# Patient Record
Sex: MALE | Race: WHITE | NOT HISPANIC OR LATINO | Employment: OTHER | URBAN - METROPOLITAN AREA
[De-identification: names, ages, dates, MRNs, and addresses within clinical notes are randomized per-mention and may not be internally consistent; named-entity substitution may affect disease eponyms.]

---

## 2017-10-23 ENCOUNTER — APPOINTMENT (OUTPATIENT)
Dept: PHYSICAL THERAPY | Facility: REHABILITATION | Age: 72
End: 2017-10-23
Payer: COMMERCIAL

## 2017-10-23 PROCEDURE — 97110 THERAPEUTIC EXERCISES: CPT

## 2017-10-23 PROCEDURE — G8991 OTHER PT/OT GOAL STATUS: HCPCS

## 2017-10-23 PROCEDURE — 97161 PT EVAL LOW COMPLEX 20 MIN: CPT

## 2017-10-23 PROCEDURE — G8990 OTHER PT/OT CURRENT STATUS: HCPCS

## 2017-10-31 ENCOUNTER — APPOINTMENT (OUTPATIENT)
Dept: PHYSICAL THERAPY | Facility: REHABILITATION | Age: 72
End: 2017-10-31
Payer: COMMERCIAL

## 2017-10-31 PROCEDURE — 97140 MANUAL THERAPY 1/> REGIONS: CPT

## 2017-10-31 PROCEDURE — 97012 MECHANICAL TRACTION THERAPY: CPT

## 2017-10-31 PROCEDURE — 97110 THERAPEUTIC EXERCISES: CPT

## 2017-11-02 ENCOUNTER — APPOINTMENT (OUTPATIENT)
Dept: PHYSICAL THERAPY | Facility: REHABILITATION | Age: 72
End: 2017-11-02
Payer: COMMERCIAL

## 2017-11-03 ENCOUNTER — APPOINTMENT (OUTPATIENT)
Dept: PHYSICAL THERAPY | Facility: REHABILITATION | Age: 72
End: 2017-11-03
Payer: COMMERCIAL

## 2017-11-03 PROCEDURE — 97140 MANUAL THERAPY 1/> REGIONS: CPT

## 2017-11-03 PROCEDURE — 97012 MECHANICAL TRACTION THERAPY: CPT

## 2017-11-03 PROCEDURE — 97110 THERAPEUTIC EXERCISES: CPT

## 2017-11-07 ENCOUNTER — APPOINTMENT (OUTPATIENT)
Dept: PHYSICAL THERAPY | Facility: REHABILITATION | Age: 72
End: 2017-11-07
Payer: COMMERCIAL

## 2017-11-07 PROCEDURE — 97140 MANUAL THERAPY 1/> REGIONS: CPT

## 2017-11-07 PROCEDURE — 97012 MECHANICAL TRACTION THERAPY: CPT

## 2017-11-07 PROCEDURE — 97110 THERAPEUTIC EXERCISES: CPT

## 2017-11-09 ENCOUNTER — APPOINTMENT (OUTPATIENT)
Dept: PHYSICAL THERAPY | Facility: REHABILITATION | Age: 72
End: 2017-11-09
Payer: COMMERCIAL

## 2017-11-09 PROCEDURE — 97012 MECHANICAL TRACTION THERAPY: CPT

## 2017-11-09 PROCEDURE — 97110 THERAPEUTIC EXERCISES: CPT

## 2017-11-09 PROCEDURE — 97140 MANUAL THERAPY 1/> REGIONS: CPT

## 2017-11-14 ENCOUNTER — APPOINTMENT (OUTPATIENT)
Dept: PHYSICAL THERAPY | Facility: REHABILITATION | Age: 72
End: 2017-11-14
Payer: COMMERCIAL

## 2017-11-14 PROCEDURE — 97110 THERAPEUTIC EXERCISES: CPT

## 2017-11-14 PROCEDURE — 97140 MANUAL THERAPY 1/> REGIONS: CPT

## 2017-11-14 PROCEDURE — 97012 MECHANICAL TRACTION THERAPY: CPT

## 2017-11-16 ENCOUNTER — APPOINTMENT (OUTPATIENT)
Dept: PHYSICAL THERAPY | Facility: REHABILITATION | Age: 72
End: 2017-11-16
Payer: COMMERCIAL

## 2017-11-16 PROCEDURE — 97110 THERAPEUTIC EXERCISES: CPT

## 2017-11-16 PROCEDURE — 97140 MANUAL THERAPY 1/> REGIONS: CPT

## 2017-11-16 PROCEDURE — 97014 ELECTRIC STIMULATION THERAPY: CPT

## 2017-11-21 ENCOUNTER — APPOINTMENT (OUTPATIENT)
Dept: PHYSICAL THERAPY | Facility: REHABILITATION | Age: 72
End: 2017-11-21
Payer: COMMERCIAL

## 2017-11-21 PROCEDURE — G8990 OTHER PT/OT CURRENT STATUS: HCPCS

## 2017-11-21 PROCEDURE — 97110 THERAPEUTIC EXERCISES: CPT

## 2017-11-21 PROCEDURE — G8991 OTHER PT/OT GOAL STATUS: HCPCS

## 2017-11-21 PROCEDURE — 97140 MANUAL THERAPY 1/> REGIONS: CPT

## 2017-11-28 ENCOUNTER — APPOINTMENT (OUTPATIENT)
Dept: PHYSICAL THERAPY | Facility: REHABILITATION | Age: 72
End: 2017-11-28
Payer: COMMERCIAL

## 2017-11-28 PROCEDURE — 97014 ELECTRIC STIMULATION THERAPY: CPT

## 2017-11-28 PROCEDURE — 97110 THERAPEUTIC EXERCISES: CPT

## 2017-11-28 PROCEDURE — 97140 MANUAL THERAPY 1/> REGIONS: CPT

## 2017-11-30 ENCOUNTER — APPOINTMENT (OUTPATIENT)
Dept: PHYSICAL THERAPY | Facility: REHABILITATION | Age: 72
End: 2017-11-30
Payer: COMMERCIAL

## 2020-12-21 ENCOUNTER — NURSE TRIAGE (OUTPATIENT)
Dept: OTHER | Facility: OTHER | Age: 75
End: 2020-12-21

## 2020-12-21 DIAGNOSIS — Z20.828 EXPOSURE TO SARS-ASSOCIATED CORONAVIRUS: ICD-10-CM

## 2020-12-21 DIAGNOSIS — Z20.828 EXPOSURE TO SARS-ASSOCIATED CORONAVIRUS: Primary | ICD-10-CM

## 2020-12-21 PROCEDURE — U0003 INFECTIOUS AGENT DETECTION BY NUCLEIC ACID (DNA OR RNA); SEVERE ACUTE RESPIRATORY SYNDROME CORONAVIRUS 2 (SARS-COV-2) (CORONAVIRUS DISEASE [COVID-19]), AMPLIFIED PROBE TECHNIQUE, MAKING USE OF HIGH THROUGHPUT TECHNOLOGIES AS DESCRIBED BY CMS-2020-01-R: HCPCS | Performed by: FAMILY MEDICINE

## 2020-12-22 LAB — SARS-COV-2 RNA SPEC QL NAA+PROBE: NOT DETECTED

## 2021-05-06 PROBLEM — I44.7 LBBB (LEFT BUNDLE BRANCH BLOCK): Status: ACTIVE | Noted: 2017-11-17

## 2021-05-06 PROBLEM — E78.00 PURE HYPERCHOLESTEROLEMIA: Status: ACTIVE | Noted: 2017-11-20

## 2021-05-06 PROBLEM — I10 HYPERTENSION, ESSENTIAL: Status: ACTIVE | Noted: 2017-11-17

## 2021-05-06 PROBLEM — I25.10 CAD S/P PERCUTANEOUS CORONARY ANGIOPLASTY: Status: ACTIVE | Noted: 2020-07-02

## 2021-05-06 PROBLEM — K21.9 GERD (GASTROESOPHAGEAL REFLUX DISEASE): Status: ACTIVE | Noted: 2017-11-17

## 2021-05-06 PROBLEM — D33.3 SCHWANNOMA OF CRANIAL NERVE (HCC): Status: ACTIVE | Noted: 2017-01-05

## 2021-05-06 PROBLEM — I35.1 MILD AORTIC REGURGITATION: Status: ACTIVE | Noted: 2017-11-20

## 2021-05-06 PROBLEM — Z98.61 CAD S/P PERCUTANEOUS CORONARY ANGIOPLASTY: Status: ACTIVE | Noted: 2020-07-02

## 2024-12-03 ENCOUNTER — OFFICE VISIT (OUTPATIENT)
Dept: PULMONOLOGY | Facility: CLINIC | Age: 79
End: 2024-12-03
Payer: COMMERCIAL

## 2024-12-03 VITALS
OXYGEN SATURATION: 96 % | HEIGHT: 72 IN | HEART RATE: 65 BPM | BODY MASS INDEX: 27.85 KG/M2 | TEMPERATURE: 97.1 F | DIASTOLIC BLOOD PRESSURE: 80 MMHG | SYSTOLIC BLOOD PRESSURE: 130 MMHG | WEIGHT: 205.6 LBS

## 2024-12-03 DIAGNOSIS — R06.00 DYSPNEA, UNSPECIFIED TYPE: Primary | ICD-10-CM

## 2024-12-03 PROCEDURE — 99204 OFFICE O/P NEW MOD 45 MIN: CPT | Performed by: INTERNAL MEDICINE

## 2024-12-03 RX ORDER — FLUTICASONE PROPIONATE 50 MCG
1-2 SPRAY, SUSPENSION (ML) NASAL DAILY
COMMUNITY

## 2024-12-03 NOTE — PROGRESS NOTES
Name: Kayden Ventura      : 1945      MRN: [id number]  Encounter Provider: Dada Delgado MD  Encounter Date: 12/3/2024   Encounter department: [facility] [place]  :  Assessment & Plan  Dyspnea, unspecified type  He had some dyspnea during the summertime when working outside in the heat.  This KAPLAN appears to have resolved without intervention.  No obvious pulmonary pathology on history.  Previously had pulmonary function testing and chest x-ray appears normal.  Possibly due to age-related decline in lung function, respiratory muscle strength.    Will check a chest x-ray and PFT to ensure that there is no pulmonary pathology that could be contributing to this dyspnea with exertion.  Follow-up in a few months after results  Orders:    XR chest pa and lateral; Future    Complete PFT with post Bronchodilator and Six Minute walk; Future      History of Present Illness   Kayden Ventura is a 78 y.o. male with a history of left bundle branch block, hypertension, CAD status post PCI, GERD, schwannoma s/p gamma knife radiation , hyperlipidemia, CKD3, who is presenting for evaluation of dyspnea on exertion    He is with his wife today.  His wife wanted him to be evaluated for any lung disease.  During the summertime he gets exhausted during yard work and gets short of breath.  His wife notes that these usually occur when it is very hot outside.  The symptoms appears to have resolved in the fall and wintertime.  He is able to do activity without significant shortness of breath.    No known lung disease.  No history of asthma/hay fever.  Not born premature. He smoked up to a pack a day for 10-15 years and quit when he was [age] years old.      Mild amount of nasal allergies.  He has heartburn occasionally.  No cough.  No fevers/chills.  No weight loss.      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 No exotic pets.  No  foreign travel.   - [employer] 2 years in [place].  No TB exposure.  No mold/water damage.      Review of Systems  Past Medical History   Past Medical History:   Diagnosis Date    CAD S/P percutaneous coronary angioplasty 7/2/2020    Last Assessment & Plan:  Formatting of this note might be different from the original. - Status post GORDON to D1 and PCI to D2, mild ostial stenosis of left main at 35%, preserved EF -Continue DAPT for a minimum of 1 year.  He is aware not to miss any doses. -Continue Repatha, statin and beta-blocker - He denies any anginal complaints - Cardiac rehab as scheduled -Heart healthy diet and getting regu    GERD (gastroesophageal reflux disease) 11/17/2017    Hypertension, essential 11/17/2017    Last Assessment & Plan:  Formatting of this note might be different from the original. - Recent symptoms concerning for possible hypotension and Cardura was reduced to 2 mg daily - Symptoms have resolved blood pressure at goal today - He will continue to monitor blood pressure and call office if it is not within the recommended parameters - Low-sodium diet recommended    LBBB (left bundle branch block) 11/17/2017    Mild aortic regurgitation 11/20/2017    Pure hypercholesterolemia 11/20/2017    Schwannoma of cranial nerve (HCC) 1/5/2017     Past Surgical History:   Procedure Laterality Date    COLONOSCOPY  02/2016    CLYDE Logan. Diverticulosis, polyp at the biopsy showed mucosal bump.    COLONOSCOPY  2010    CLYDE Camejo. Polyp removed, lost in retrieval.    COLONOSCOPY  2007    CLYDE Camejo. Left colon tubular adenoma polyp removed.    EGD  04/2019    CLYDE Logan. History of hiatal hernia and Schatzki ring, symptoms of dysphagia. 3 cm hiatal hernia, dilated with 48 [place], biopsy showed reflux esophagitis.    EGD  2017    CLYDE Logan. 2 cm hiatal hernia, 44 [place] dilatation of Schatzki ring.     History reviewed. No pertinent family history.   reports that he has quit  smoking. His smoking use included cigarettes. He started smoking about 67 years ago. He has a 67.9 pack-year smoking history. He has never used smokeless tobacco. He reports current alcohol use.  Current Outpatient Medications on File Prior to Visit   Medication Sig Dispense Refill    aspirin (ECOTRIN LOW STRENGTH) 81 mg EC tablet Take 81 mg by mouth daily      clopidogrel (PLAVIX) 75 mg tablet Take 75 mg by mouth daily (Patient taking differently: Take 75 mg by mouth daily 1/2 tablet)      Evolocumab (REPATHA SC) Inject 140 mL under the skin every 30 (thirty) days      famotidine (PEPCID) 40 MG tablet Take 40 mg by mouth daily      fexofenadine (ALLEGRA) 180 MG tablet Take 180 mg by mouth daily      fluticasone (FLONASE) 50 mcg/act nasal spray 1-2 sprays into each nostril daily      metoprolol tartrate (LOPRESSOR) 25 mg tablet Take 25 mg by mouth every 12 (twelve) hours      Cholecalciferol 50 MCG (2000 UT) CAPS Take 1 capsule by mouth daily      doxazosin (CARDURA) 4 mg tablet Take 4 mg by mouth daily at bedtime (Patient not taking: Reported on 12/3/2024)      hydrochlorothiazide (HYDRODIURIL) 25 mg tablet Take 25 mg by mouth daily (Patient not taking: Reported on 12/3/2024)      losartan (COZAAR) 100 MG tablet Take 100 mg by mouth daily (Patient not taking: Reported on 12/3/2024)      pravastatin (PRAVACHOL) 10 mg tablet Take 10 mg by mouth daily (Patient not taking: Reported on 12/3/2024)      Sodium Sulfate-Mag Sulfate-KCl (Sutab) 0243-556-834 MG TABS Take 1 kit by mouth see administration instructions 24 tablet 0     No current facility-administered medications on file prior to visit.     Allergies   Allergen Reactions    Advil [Ibuprofen] Other (See Comments)     Leg swelling    Penicillin V Other (See Comments)     Leg swelling      Pertinent Medical History           Historical Information       Objective   /80 (BP Location: Left arm, Patient Position: Sitting, Cuff Size: Large)   Pulse 65   Temp  (!) 97.1 °F (36.2 °C) (Tympanic Core)   Ht 6' (1.829 m)   Wt 93.3 kg (205 lb 9.6 oz)   SpO2 96%   BMI 27.88 kg/m²      Physical Exam  Vitals and nursing note reviewed.   Constitutional:       General: He is not in acute distress.     Appearance: Normal appearance. He is well-developed. He is not ill-appearing, toxic-appearing or diaphoretic.      Comments: dysarthria   HENT:      Head: Normocephalic and atraumatic.      Mouth/Throat:      Mouth: Mucous membranes are moist.      Pharynx: Oropharynx is clear. No oropharyngeal exudate.   Eyes:      Conjunctiva/sclera: Conjunctivae normal.   Cardiovascular:      Rate and Rhythm: Normal rate and regular rhythm.   Pulmonary:      Effort: Pulmonary effort is normal. No respiratory distress.      Breath sounds: Normal breath sounds. No stridor.   Abdominal:      Tenderness: There is no guarding.   Musculoskeletal:         General: No swelling.      Cervical back: Normal range of motion and neck supple. No rigidity.      Right lower leg: No edema.      Left lower leg: No edema.   Skin:     General: Skin is warm and dry.   Neurological:      General: No focal deficit present.      Mental Status: He is alert and oriented to person, place, and time. Mental status is at baseline.   Psychiatric:         Mood and Affect: Mood normal.         Lab Results: I have reviewed pertinent labs.    Radiology Results Review: I personally reviewed the following image studies in PACS and associated radiology reports: chest xray. My interpretation of the radiology images/reports is: 4/12/22 chest x-ray-no significant lung parenchymal abnormality.  Other Study Results: Other Study Results Review : Other studies reviewed include: echo  6/30/23 echo  Normal LVEF.  Grade 1 diastolic dysfunction.  RV not well-visualized but overall normal-appearing.    PFT Results Reviewed: reviewed    [facility] 7/26/2023  Results:  The FVC is normal  The FEV1 is normal  The FEV1/FVC is normal    No bronchodilator  was administered  The TLC is reduced [5.64 (78%)]. The RV is normal.  The DLCO is normal.  The Flow-Volume Loop demonstrates normal pattern.

## 2024-12-11 ENCOUNTER — HOSPITAL ENCOUNTER (OUTPATIENT)
Dept: RADIOLOGY | Facility: HOSPITAL | Age: 79
Discharge: HOME | End: 2024-12-11
Payer: COMMERCIAL

## 2024-12-11 ENCOUNTER — RESULTS FOLLOW-UP (OUTPATIENT)
Dept: SLEEP CENTER | Facility: CLINIC | Age: 79
End: 2024-12-11

## 2024-12-11 ENCOUNTER — HOSPITAL ENCOUNTER (OUTPATIENT)
Dept: PULMONOLOGY | Facility: HOSPITAL | Age: 79
Discharge: HOME | End: 2024-12-11
Attending: INTERNAL MEDICINE
Payer: COMMERCIAL

## 2024-12-11 DIAGNOSIS — R06.00 DYSPNEA, UNSPECIFIED TYPE: ICD-10-CM

## 2024-12-11 PROCEDURE — 94618 PULMONARY STRESS TESTING: CPT | Performed by: INTERNAL MEDICINE

## 2024-12-11 PROCEDURE — 94729 DIFFUSING CAPACITY: CPT | Performed by: INTERNAL MEDICINE

## 2024-12-11 PROCEDURE — 71046 X-RAY EXAM CHEST 2 VIEWS: CPT

## 2024-12-11 PROCEDURE — 94060 EVALUATION OF WHEEZING: CPT | Performed by: INTERNAL MEDICINE

## 2024-12-11 RX ORDER — ALBUTEROL SULFATE 0.83 MG/ML
2.5 SOLUTION RESPIRATORY (INHALATION) ONCE AS NEEDED
Status: COMPLETED | OUTPATIENT
Start: 2024-12-11 | End: 2024-12-11

## 2024-12-11 RX ADMIN — ALBUTEROL SULFATE 2.5 MG: 2.5 SOLUTION RESPIRATORY (INHALATION) at 13:52

## 2024-12-16 NOTE — PROGRESS NOTES
I called the patient regarding his pulmonary function test and 6 minute walk (he had desaturation after 5 minutes of walking to 87% and was placed on 1 liter of supplemental oxygen    I discussed prescribing supplemental oxygen at 1 liters/minute during ambulation and patient declined despite risk/benefit discussion.  He says he is not short of breath, lightheaded, or dizzying with ambulation    I recommended getting a pulse oximeter and monitor his oxygen levels and let me know if he is ever hypoxemic during exertion.  Patient voices understanding.  If he is symptomatic during exertion I recommend checking pulse oximetry and let us know

## 2025-02-04 ENCOUNTER — OFFICE VISIT (OUTPATIENT)
Dept: PULMONOLOGY | Facility: CLINIC | Age: 80
End: 2025-02-04
Payer: COMMERCIAL

## 2025-02-04 VITALS
TEMPERATURE: 97.2 F | HEART RATE: 58 BPM | SYSTOLIC BLOOD PRESSURE: 118 MMHG | HEIGHT: 72 IN | BODY MASS INDEX: 28.04 KG/M2 | OXYGEN SATURATION: 94 % | DIASTOLIC BLOOD PRESSURE: 64 MMHG | WEIGHT: 207 LBS

## 2025-02-04 DIAGNOSIS — J98.6 CHRONICALLY ELEVATED HEMIDIAPHRAGM: Primary | ICD-10-CM

## 2025-02-04 DIAGNOSIS — R09.02 HYPOXIA: ICD-10-CM

## 2025-02-04 PROCEDURE — G2211 COMPLEX E/M VISIT ADD ON: HCPCS | Performed by: INTERNAL MEDICINE

## 2025-02-04 PROCEDURE — 99214 OFFICE O/P EST MOD 30 MIN: CPT | Performed by: INTERNAL MEDICINE

## 2025-02-04 RX ORDER — SPIRONOLACTONE 25 MG/1
1 TABLET ORAL DAILY
COMMUNITY
Start: 2025-02-01

## 2025-02-04 RX ORDER — GABAPENTIN 300 MG/1
300 CAPSULE ORAL 3 TIMES DAILY
COMMUNITY
Start: 2024-12-23

## 2025-02-04 RX ORDER — TELMISARTAN 80 MG/1
80 TABLET ORAL DAILY
COMMUNITY

## 2025-02-04 RX ORDER — GLUCOSAMINE/CHONDR SU A SOD 750-600 MG
1 TABLET ORAL DAILY
COMMUNITY

## 2025-02-04 RX ORDER — TAMSULOSIN HYDROCHLORIDE 0.4 MG/1
0.4 CAPSULE ORAL
COMMUNITY

## 2025-02-04 NOTE — PROGRESS NOTES
Name: Kayden Ventura      : 1945      MRN: [id number]  Encounter Provider: Dada Delgado MD  Encounter Date: 2025   Encounter department: [facility]  :  Assessment & Plan  Chronically elevated hemidiaphragm  Chronically elevated right hemidiaphragm likely accounting for dyspnea on exertion during the summertime when he is mowing his lawn.  Unclear if the diaphragm is paralyzed versus have an titration.      This is reflected in the pulmonary function testing as a restrictive physiology    I offered a sniff test but they declined since I did not think there would be much that we would do other than physical therapy.    If diaphragm is paralyzed I am unsure what the inciting event was - possibly due to a fall many years ago.      He did not want to do pulmonary rehabilitation  No indication for plication, also not a great surgical candidate  No further intervention, he can follow-up with me as needed.  I have advised him not to exert himself too much and use breathing exercises to help compensate for a potentially paralyzed hemidiaphragm.       Hypoxia  On 6-minute walk in 2024 he had normal baseline SpO2 but after 5 minutes of walking, he had desaturation down to 87% and was placed on 1 L/min of oxygen.  I offered to prescribe supplemental oxygen for exertion but he declined.  I discussed risks and benefits of supplemental oxygen.  The degree of oxygen desaturation on exertion was very mild and he did not want to use supplemental oxygen           History of Present Illness   Kayden Ventura is a 79 y.o. male with a history of left bundle branch block, hypertension, CAD status post PCI, GERD, schwannoma s/p gamma knife radiation , hyperlipidemia, CKD3, who is presenting for follow up of dyspnea on exertion     25 - Follow up -he did not want to start supplemental oxygen after my phone call with him in December after the PFT.  No new symptoms from a  respiratory standpoint.    12/3/24 - Consult - He is with his wife today.  His wife wanted him to be evaluated for any lung disease.  During the summertime he gets exhausted during yard work and gets short of breath.  His wife notes that these usually occur when it is very hot outside.  The symptoms appears to have resolved in the fall and wintertime.  He is able to do activity without significant shortness of breath.     No known lung disease.  No history of asthma/hay fever.  Not born premature. He smoked up to a pack a day for 10-15 years and quit when he was [age] years old.       Mild amount of nasal allergies.  He has heartburn occasionally.  No cough.  No fevers/chills.  No weight loss.       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 No exotic pets.  No foreign travel.   - [employer] 2 years in [place].  No TB exposure.  No mold/water damage.      Review of Systems  Past Medical History   Past Medical History:   Diagnosis Date    CAD S/P percutaneous coronary angioplasty 7/2/2020    Last Assessment & Plan:  Formatting of this note might be different from the original. - Status post GORDON to D1 and PCI to D2, mild ostial stenosis of left main at 35%, preserved EF -Continue DAPT for a minimum of 1 year.  He is aware not to miss any doses. -Continue Repatha, statin and beta-blocker - He denies any anginal complaints - Cardiac rehab as scheduled -Heart healthy diet and getting regu    GERD (gastroesophageal reflux disease) 11/17/2017    Hypertension, essential 11/17/2017    Last Assessment & Plan:  Formatting of this note might be different from the original. - Recent symptoms concerning for possible hypotension and Cardura was reduced to 2 mg daily - Symptoms have resolved blood pressure at goal today - He will continue to monitor blood pressure and call office if it is not within the recommended parameters - Low-sodium diet recommended     LBBB (left bundle branch block) 11/17/2017    Mild aortic regurgitation 11/20/2017    Pure hypercholesterolemia 11/20/2017    Schwannoma of cranial nerve (HCC) 1/5/2017     Past Surgical History:   Procedure Laterality Date    COLONOSCOPY  02/2016    CLYDE Logan. Diverticulosis, polyp at the biopsy showed mucosal bump.    COLONOSCOPY  2010    CLYDE Camejo. Polyp removed, lost in retrieval.    COLONOSCOPY  2007    CLYDE Camejo. Left colon tubular adenoma polyp removed.    EGD  04/2019    CLYDE Logan. History of hiatal hernia and Schatzki ring, symptoms of dysphagia. 3 cm hiatal hernia, dilated with 48 [place], biopsy showed reflux esophagitis.    EGD  2017    CLYDE Logan. 2 cm hiatal hernia, 44 [place] dilatation of Schatzki ring.     History reviewed. No pertinent family history.   reports that he has quit smoking. His smoking use included cigarettes. He started smoking about 68 years ago. He has a 68.1 pack-year smoking history. He has never used smokeless tobacco. He reports current alcohol use. He reports that he does not use drugs.  Current Outpatient Medications on File Prior to Visit   Medication Sig Dispense Refill    aspirin (ECOTRIN LOW STRENGTH) 81 mg EC tablet Take 81 mg by mouth daily      Cholecalciferol 50 MCG (2000 UT) CAPS Take 1 capsule by mouth daily      clopidogrel (PLAVIX) 75 mg tablet Take 75 mg by mouth daily      Evolocumab (REPATHA SC) Inject 140 mL under the skin every 30 (thirty) days      famotidine (PEPCID) 40 MG tablet Take 40 mg by mouth daily      fexofenadine (ALLEGRA) 180 MG tablet Take 180 mg by mouth daily      fluticasone (FLONASE) 50 mcg/act nasal spray 1-2 sprays into each nostril daily      gabapentin (NEURONTIN) 300 mg capsule Take 300 mg by mouth 3 (three) times a day      Lutein-Zeaxanthin 25-5 MG CAPS Take 1 capsule by mouth daily      metoprolol tartrate (LOPRESSOR) 25 mg tablet Take 25 mg by mouth every 12 (twelve) hours      Sodium Sulfate-Mag  Sulfate-KCl (Sutab) 9629-801-481 MG TABS Take 1 kit by mouth see administration instructions 24 tablet 0    spironolactone (ALDACTONE) 25 mg tablet Take 1 tablet by mouth in the morning      tamsulosin (FLOMAX) 0.4 mg Take 0.4 mg by mouth daily with dinner      telmisartan (MICARDIS) 80 MG tablet Take 80 mg by mouth daily      doxazosin (CARDURA) 4 mg tablet Take 4 mg by mouth daily at bedtime (Patient not taking: Reported on 2/4/2025)      hydrochlorothiazide (HYDRODIURIL) 25 mg tablet Take 25 mg by mouth daily (Patient not taking: Reported on 2/4/2025)      losartan (COZAAR) 100 MG tablet Take 100 mg by mouth daily (Patient not taking: Reported on 2/4/2025)      pravastatin (PRAVACHOL) 10 mg tablet Take 10 mg by mouth daily (Patient not taking: Reported on 2/4/2025)       No current facility-administered medications on file prior to visit.     Allergies   Allergen Reactions    Advil [Ibuprofen] Other (See Comments)     Leg swelling    Penicillin V Other (See Comments)     Leg swelling         Historical Information       Objective   /64 (BP Location: Left arm, Patient Position: Sitting, Cuff Size: Large)   Pulse 58   Temp (!) 97.2 °F (36.2 °C) (Tympanic)   Ht 6' (1.829 m)   Wt 93.9 kg (207 lb)   SpO2 94%   BMI 28.07 kg/m²      Physical Exam  Vitals and nursing note reviewed.   Constitutional:       General: He is not in acute distress.     Appearance: Normal appearance. He is well-developed. He is not ill-appearing, toxic-appearing or diaphoretic.   HENT:      Head: Normocephalic and atraumatic.   Eyes:      Conjunctiva/sclera: Conjunctivae normal.   Cardiovascular:      Rate and Rhythm: Normal rate.   Pulmonary:      Effort: Pulmonary effort is normal. No respiratory distress.   Abdominal:      Tenderness: There is no guarding.   Musculoskeletal:      Cervical back: Normal range of motion. No rigidity.   Neurological:      Mental Status: He is alert and oriented to person, place, and time. Mental  status is at baseline.      Comments: Baseline aphasia   Psychiatric:         Mood and Affect: Mood normal.         Lab Results: I have reviewed pertinent labs.    Radiology Results Review: I personally reviewed the following image studies in PACS and associated radiology reports: chest xray. My interpretation of the radiology images/reports is: 12/11/24 CXR -eventration right-sided hemidiaphragm.  2017 chest x-ray also shows the same..  Other Study Results: Other Study Results Review : Other studies reviewed include: 9/2024 MRI brain with and without contrast.  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  No acute intracranial findings.  Severe white matter changes.  Opacification of the left sphenoid sinus.  PFT Results Reviewed: reviewed and interpreted  Spirometry suggest preserved ratio impaired spirometry.  No postbronchodilator response.  Lung volumes were unable to be performed.  Abnormal flow-volume loop.  Moderately decreased DLCO.